# Patient Record
Sex: FEMALE | Race: BLACK OR AFRICAN AMERICAN | NOT HISPANIC OR LATINO | Employment: FULL TIME | URBAN - METROPOLITAN AREA
[De-identification: names, ages, dates, MRNs, and addresses within clinical notes are randomized per-mention and may not be internally consistent; named-entity substitution may affect disease eponyms.]

---

## 2018-06-05 PROBLEM — O26.872 SHORT CERVIX DURING PREGNANCY IN SECOND TRIMESTER: Status: ACTIVE | Noted: 2018-06-05

## 2018-06-05 PROBLEM — O09.212 HISTORY OF PRETERM LABOR, CURRENT PREGNANCY, SECOND TRIMESTER: Status: ACTIVE | Noted: 2018-06-05

## 2018-06-18 ENCOUNTER — OFFICE VISIT (OUTPATIENT)
Dept: OBSTETRICS AND GYNECOLOGY | Facility: CLINIC | Age: 28
End: 2018-06-18
Payer: MEDICAID

## 2018-06-18 VITALS — WEIGHT: 211.19 LBS | SYSTOLIC BLOOD PRESSURE: 138 MMHG | DIASTOLIC BLOOD PRESSURE: 78 MMHG

## 2018-06-18 DIAGNOSIS — O26.872 SHORT CERVIX DURING PREGNANCY IN SECOND TRIMESTER: ICD-10-CM

## 2018-06-18 DIAGNOSIS — Z3A.30 30 WEEKS GESTATION OF PREGNANCY: Primary | ICD-10-CM

## 2018-06-18 DIAGNOSIS — O09.212 HISTORY OF PRETERM LABOR, CURRENT PREGNANCY, SECOND TRIMESTER: ICD-10-CM

## 2018-06-18 PROCEDURE — 99999 PR PBB SHADOW E&M-NEW PATIENT-LVL III: CPT | Mod: PBBFAC,,, | Performed by: SPECIALIST

## 2018-06-18 PROCEDURE — 99203 OFFICE O/P NEW LOW 30 MIN: CPT | Mod: PBBFAC,TH,PN | Performed by: SPECIALIST

## 2018-06-18 PROCEDURE — 99204 OFFICE O/P NEW MOD 45 MIN: CPT | Mod: S$PBB,TH,, | Performed by: SPECIALIST

## 2018-06-18 NOTE — PROGRESS NOTES
"28 yo BF  presents for transefer of PNC. Pt 30 weeks EGA with significant H/O  delivery consistent with incompetent cervix at 30 weeks 2010. Pt has been eval by MFM with noted cervical lenfth 1.5cm. pty placed pn vaginal progesterone. Pt denies DFM, CTXs, vaginal d/c, LBOW, dysuria.    Past Medical History:   Diagnosis Date    Abnormal Pap smear of cervix        History reviewed. No pertinent surgical history.    History reviewed. No pertinent family history.    Social History     Social History    Marital status: Single     Spouse name: N/A    Number of children: N/A    Years of education: N/A     Social History Main Topics    Smoking status: Never Smoker    Smokeless tobacco: Former User    Alcohol use No    Drug use: No    Sexual activity: Yes     Partners: Male     Other Topics Concern    None     Social History Narrative    None       Current Outpatient Prescriptions   Medication Sig Dispense Refill    prenatal vit,calc76/iron/folic (PNV 29-1 ORAL) Take by mouth.      progesterone (PROMETRIUM) 200 MG capsule Place 1 capsule (200 mg total) vaginally nightly. 30 capsule 11     No current facility-administered medications for this visit.        Review of patient's allergies indicates:   Allergen Reactions    Bacitracin Nausea Only   Indication  ========  Hx of PTD at 30 wks in 2nd pregnancy; Dr. Lopez.    History  ======  Previous Outcomes   3  Para 2    Maternal Assessment  =================  Weight 95 kg  Weight (lb) 210 lb  BP syst 139 mmHg  BP diast 83 mmHg    Method  ======  Transabdominal ultrasound examination. View: Sufficient.    Pregnancy  =========  Boucher pregnancy. Number of fetuses: 1.    Dating  ======  GA by "stated dating" 23 w + 6 d  MARIOLA by "stated dating": 2018  Ultrasound examination on: 2018  GA by U/S based upon: AC, BPD, Femur, HC  GA by U/S 24 w + 4 d  MARIOLA by U/S: 2018  Assigned: Dating performed on 2018, based on the external " assessment  Assigned GA 23 w + 6 d  Assigned MARIOLA: 2018    General Evaluation  ==============  Cardiac activity: present.  bpm.  Fetal movements: visualized.  Presentation: cephalic.  Placenta: anterior.  Umbilical cord: 3 vessel cord.  Amniotic fluid: MVP 6.7 cm.  shortened cx on transvag exam- 15mm.    Fetal Biometry  ============  Fetal Biometry  BPD 60.5 mm 24w 4d Hadlock  OFD 74.7 mm 24w 5d Yobani  .9 mm 23w 6d Hadlock  .0 mm 23w 4d Hadlock  Femur 47.8 mm 26w 0d Hadlock  Cerebellum tr 26.3 mm 24w 5d Nails  CM 5.3 mm   g 58% Joaquin  Calculated by: Hadlock (BPD-HC-AC-FL)  EFW (lb) 1 lb  EFW (oz) 9 oz  Cephalic index 0.81  HC / AC 1.16  FL / BPD 0.79  FL / HC 0.22  FL / AC 0.25  MVP 6.7 cm   bpm    Fetal Anatomy  ===========  Cranium: normal  Lateral ventricles: normal  Choroid plexus: normal  Midline falx: normal  Cavum septi pellucidi: normal  Cerebellum: normal  Cisterna magna: normal  Lips: normal  Profile: suboptimal  Nose: normal  4-chamber view: normal  RVOT: normal  LVOT: normal  Heart / Thorax: septum appears intact  Situs: normal  Aortic arch: normal  Ductal arch: normal  SVC: normal  IVC: normal  3-vessel view: normal  Cord insertion: normal  Stomach: normal  Kidneys: normal  Bladder: normal  Genitals: normal  Cervical spine: normal  Thoracic spine: normal  Lumbar spine: normal  Sacral spine: normal  Arms: normal  Legs: normal  Gender: female    Maternal Structures  ===============  Uterus / Cervix  Cervical length 15.1 mm    Consultation  ==========  Chief complaint: History  Labor  Provider requesting consultation: Dr. Lopez  27 y.o. [id number] 23w6d EGA.  PMH:  Past Medical History:  Diagnosis Date  ? Abnormal Pap smear of cervix    PObHx:  OB History   Para Term  AB Living  3 2 1 1 2  SAB TAB Ectopic Multiple Live Births  2  # Outcome Date GA Lbr Keyshawn/2nd Weight Sex Delivery Anes PTL Lv  3 Current  2   30w0d 1.956 kg (4 lb 5  oz) M Vag-Spont N DONNA  1 Term  2.155 kg (4 lb 12 oz) M Vag-Spont N DONNA    PSH:History reviewed. No pertinent surgical history.  Family history:family history is not on file.  Social history: reports that she has never smoked. She has quit using smokeless tobacco. She reports that she does not drink alcohol or use drugs.  A detailed fetal anatomical ultrasound was completed today. See details in imaging section of [facility].  The patient is referred for a history of a 30 week delivery after having had a short cervix documented at 26 weeks. She never felt any contractions and was sent to the  hospital at 30 weeks 5 cm dilated.  On to' US the cervix measures 15 mm transvaginally. Significant mid-gestational cervical length shortening and its association with  birth were discussed with  the patient at length. I reviewed wih her the decreased incidence of  birth in nulliparous women who were given nightly vaginal progesterone (prometrium 200mg). I  also recommended to the patient the avoidance of sexual intercourse and excertional activity. I reviewed the signs and symptoms of  labor in depth. Prescription  given for progesterone to begin tonight.  Results of today's ultrasound discussed with patient. I spent 30 minutes with patient today over half of which was in consultation separate of her ultrasound examination.  Referring physician to receive copy of today's consultation via electronic medical record. Dr. Lopez contacted by phone  .    EXAM  VSS              Abdomen gravid, FH 29cm  LLQ              Cervix FT/thick     I had a 20 min discussion regarding PTL and Incomp cervix  Discussed pelvic rest and PTL precautions    I will obtain records for review  Pt to keep MFM appt Dr Jef BRYANT 2 weeks

## 2018-07-02 ENCOUNTER — ROUTINE PRENATAL (OUTPATIENT)
Dept: OBSTETRICS AND GYNECOLOGY | Facility: CLINIC | Age: 28
End: 2018-07-02
Payer: MEDICAID

## 2018-07-02 VITALS — WEIGHT: 213.38 LBS | SYSTOLIC BLOOD PRESSURE: 128 MMHG | DIASTOLIC BLOOD PRESSURE: 76 MMHG

## 2018-07-02 DIAGNOSIS — O26.872 SHORT CERVIX DURING PREGNANCY IN SECOND TRIMESTER: ICD-10-CM

## 2018-07-02 DIAGNOSIS — O09.212 HISTORY OF PRETERM LABOR, CURRENT PREGNANCY, SECOND TRIMESTER: ICD-10-CM

## 2018-07-02 DIAGNOSIS — Z3A.27 27 WEEKS GESTATION OF PREGNANCY: Primary | ICD-10-CM

## 2018-07-02 LAB
BILIRUB SERPL-MCNC: NORMAL MG/DL
BLOOD URINE, POC: NORMAL
COLOR, POC UA: YELLOW
GLUCOSE UR QL STRIP: NORMAL
KETONES UR QL STRIP: NORMAL
LEUKOCYTE ESTERASE URINE, POC: NORMAL
NITRITE, POC UA: NORMAL
PH, POC UA: 8
PROTEIN, POC: NORMAL
SPECIFIC GRAVITY, POC UA: NORMAL
UROBILINOGEN, POC UA: NORMAL

## 2018-07-02 PROCEDURE — 81002 URINALYSIS NONAUTO W/O SCOPE: CPT | Mod: PBBFAC,PN | Performed by: SPECIALIST

## 2018-07-02 PROCEDURE — 99999 PR PBB SHADOW E&M-EST. PATIENT-LVL II: CPT | Mod: PBBFAC,,, | Performed by: SPECIALIST

## 2018-07-02 PROCEDURE — 99212 OFFICE O/P EST SF 10 MIN: CPT | Mod: PBBFAC,TH,PN | Performed by: SPECIALIST

## 2018-07-02 PROCEDURE — 99214 OFFICE O/P EST MOD 30 MIN: CPT | Mod: TH,S$PBB,, | Performed by: SPECIALIST

## 2018-07-02 NOTE — PROGRESS NOTES
Excellent fetal movement  Completing GD screen today  Pt continuing vaginal prog and denies CTXs, vaginal bleeding, DFM, pelvic pressure  PTL precautions discussed  And continue pelvic rest  I reviewed pt's past medical history, past and current meds, family history, allergies and reviewed problem list  RTO 2 weeks

## 2018-07-03 ENCOUNTER — LAB VISIT (OUTPATIENT)
Dept: LAB | Facility: HOSPITAL | Age: 28
End: 2018-07-03
Attending: SPECIALIST
Payer: MEDICAID

## 2018-07-03 DIAGNOSIS — Z3A.27 27 WEEKS GESTATION OF PREGNANCY: ICD-10-CM

## 2018-07-03 LAB — GLUCOSE SERPL-MCNC: 137 MG/DL

## 2018-07-03 PROCEDURE — 36415 COLL VENOUS BLD VENIPUNCTURE: CPT | Mod: PO

## 2018-07-03 PROCEDURE — 82950 GLUCOSE TEST: CPT

## 2018-07-16 ENCOUNTER — ROUTINE PRENATAL (OUTPATIENT)
Dept: OBSTETRICS AND GYNECOLOGY | Facility: CLINIC | Age: 28
End: 2018-07-16
Payer: MEDICAID

## 2018-07-16 VITALS — WEIGHT: 212.75 LBS | DIASTOLIC BLOOD PRESSURE: 84 MMHG | SYSTOLIC BLOOD PRESSURE: 146 MMHG

## 2018-07-16 DIAGNOSIS — O26.872 SHORT CERVIX DURING PREGNANCY IN SECOND TRIMESTER: ICD-10-CM

## 2018-07-16 DIAGNOSIS — Z3A.29 29 WEEKS GESTATION OF PREGNANCY: Primary | ICD-10-CM

## 2018-07-16 DIAGNOSIS — O09.212 HISTORY OF PRETERM LABOR, CURRENT PREGNANCY, SECOND TRIMESTER: ICD-10-CM

## 2018-07-16 LAB
BILIRUB SERPL-MCNC: NORMAL MG/DL
BLOOD URINE, POC: NORMAL
COLOR, POC UA: YELLOW
GLUCOSE UR QL STRIP: NORMAL
KETONES UR QL STRIP: NORMAL
LEUKOCYTE ESTERASE URINE, POC: NORMAL
NITRITE, POC UA: NORMAL
PH, POC UA: 5
PROTEIN, POC: NORMAL
SPECIFIC GRAVITY, POC UA: NORMAL
UROBILINOGEN, POC UA: 1

## 2018-07-16 PROCEDURE — 81002 URINALYSIS NONAUTO W/O SCOPE: CPT | Mod: PBBFAC,PN | Performed by: SPECIALIST

## 2018-07-16 PROCEDURE — 99212 OFFICE O/P EST SF 10 MIN: CPT | Mod: PBBFAC,TH,PN | Performed by: SPECIALIST

## 2018-07-16 PROCEDURE — 99214 OFFICE O/P EST MOD 30 MIN: CPT | Mod: TH,S$PBB,, | Performed by: SPECIALIST

## 2018-07-16 PROCEDURE — 99999 PR PBB SHADOW E&M-EST. PATIENT-LVL II: CPT | Mod: PBBFAC,,, | Performed by: SPECIALIST

## 2018-07-16 NOTE — PROGRESS NOTES
Pt returns for continued care  Discussed PTL s/s and precautions  Pt denies overt CTXs, vaginal bleeding or spotting  I reviewed pt's past medical history, past and current meds, family history, allergies and reviewed problem list  EXAM Cervix 1/403 -2 vertex    Pt counseled on PTL and pelvic rest  RTO 2 weeks

## 2018-07-30 ENCOUNTER — ROUTINE PRENATAL (OUTPATIENT)
Dept: OBSTETRICS AND GYNECOLOGY | Facility: CLINIC | Age: 28
End: 2018-07-30
Payer: MEDICAID

## 2018-07-30 VITALS — SYSTOLIC BLOOD PRESSURE: 142 MMHG | WEIGHT: 214.94 LBS | DIASTOLIC BLOOD PRESSURE: 78 MMHG

## 2018-07-30 DIAGNOSIS — Z3A.31 31 WEEKS GESTATION OF PREGNANCY: Primary | ICD-10-CM

## 2018-07-30 DIAGNOSIS — O26.872 SHORT CERVIX DURING PREGNANCY IN SECOND TRIMESTER: ICD-10-CM

## 2018-07-30 LAB
BILIRUB SERPL-MCNC: NORMAL MG/DL
BLOOD URINE, POC: NORMAL
COLOR, POC UA: YELLOW
GLUCOSE UR QL STRIP: NORMAL
KETONES UR QL STRIP: NORMAL
LEUKOCYTE ESTERASE URINE, POC: NORMAL
NITRITE, POC UA: NORMAL
PH, POC UA: 5
PROTEIN, POC: NORMAL
SPECIFIC GRAVITY, POC UA: NORMAL
UROBILINOGEN, POC UA: NORMAL

## 2018-07-30 PROCEDURE — 81002 URINALYSIS NONAUTO W/O SCOPE: CPT | Mod: PBBFAC,PN | Performed by: SPECIALIST

## 2018-07-30 PROCEDURE — 99212 OFFICE O/P EST SF 10 MIN: CPT | Mod: PBBFAC,TH,PN | Performed by: SPECIALIST

## 2018-07-30 PROCEDURE — 99214 OFFICE O/P EST MOD 30 MIN: CPT | Mod: TH,S$PBB,, | Performed by: SPECIALIST

## 2018-07-30 PROCEDURE — 99999 PR PBB SHADOW E&M-EST. PATIENT-LVL II: CPT | Mod: PBBFAC,,, | Performed by: SPECIALIST

## 2018-07-30 NOTE — PROGRESS NOTES
Excellent fetal movement  Discussed PTL precautions, pelvic rest  EXAM Cervix FT/20 vertex soft  I reviewed pt's past medical history, past and current meds, family history, allergies and reviewed problem list  RTO 2 weeks

## 2018-08-06 ENCOUNTER — TELEPHONE (OUTPATIENT)
Dept: OBSTETRICS AND GYNECOLOGY | Facility: CLINIC | Age: 28
End: 2018-08-06

## 2018-08-06 ENCOUNTER — NURSE TRIAGE (OUTPATIENT)
Dept: ADMINISTRATIVE | Facility: CLINIC | Age: 28
End: 2018-08-06

## 2018-08-06 PROBLEM — N92.0 SPOTTING: Status: ACTIVE | Noted: 2018-08-06

## 2018-08-06 NOTE — TELEPHONE ENCOUNTER
Reason for Disposition   Abdominal pain or having contractions    Protocols used: ST PREGNANCY - VAGINAL BLEEDING GREATER THAN 20 WEEKS EGA-A-[place]    Pt states that she is 33 weeks pregnant and having vaginal spotting and contractions. States contractions are spread out and started last night. Vaginal spotting started today. Care advice given.

## 2018-08-06 NOTE — TELEPHONE ENCOUNTER
Spoke with patient informed if spotting and/or randa to go to L&D for evaluation, patient states she is on her way there now

## 2018-08-13 ENCOUNTER — ROUTINE PRENATAL (OUTPATIENT)
Dept: OBSTETRICS AND GYNECOLOGY | Facility: CLINIC | Age: 28
End: 2018-08-13
Payer: MEDICAID

## 2018-08-13 VITALS
WEIGHT: 215.38 LBS | SYSTOLIC BLOOD PRESSURE: 128 MMHG | BODY MASS INDEX: 34.76 KG/M2 | DIASTOLIC BLOOD PRESSURE: 70 MMHG

## 2018-08-13 DIAGNOSIS — Z3A.33 33 WEEKS GESTATION OF PREGNANCY: Primary | ICD-10-CM

## 2018-08-13 DIAGNOSIS — O26.872 SHORT CERVIX DURING PREGNANCY IN SECOND TRIMESTER: ICD-10-CM

## 2018-08-13 PROCEDURE — 99212 OFFICE O/P EST SF 10 MIN: CPT | Mod: TH,S$PBB,, | Performed by: SPECIALIST

## 2018-08-13 PROCEDURE — 99212 OFFICE O/P EST SF 10 MIN: CPT | Mod: PBBFAC,TH,PN | Performed by: SPECIALIST

## 2018-08-13 PROCEDURE — 81002 URINALYSIS NONAUTO W/O SCOPE: CPT | Mod: PBBFAC,PN | Performed by: SPECIALIST

## 2018-08-13 PROCEDURE — 99999 PR PBB SHADOW E&M-EST. PATIENT-LVL II: CPT | Mod: PBBFAC,,, | Performed by: SPECIALIST

## 2018-08-13 NOTE — PROGRESS NOTES
Excellent fetal movement  EXAM Cervix 2-3/70/-1 vertex  I discussed cervical change and PTL precautions  Discussed fetal kick counts daily  I reviewed pt's past medical history, past and current meds, family history, allergies and reviewed problem list  RTO 2 weeks

## 2018-08-15 PROBLEM — O26.859 SPOTTING AFFECTING PREGNANCY: Status: ACTIVE | Noted: 2018-08-15

## 2018-08-18 PROBLEM — R10.9 ABDOMINAL CRAMPING AFFECTING PREGNANCY: Status: ACTIVE | Noted: 2018-08-18

## 2018-08-18 PROBLEM — O26.899 ABDOMINAL CRAMPING AFFECTING PREGNANCY: Status: ACTIVE | Noted: 2018-08-18

## 2018-08-19 PROBLEM — O60.00 PRETERM LABOR: Status: ACTIVE | Noted: 2018-08-19

## 2018-09-20 ENCOUNTER — POSTPARTUM VISIT (OUTPATIENT)
Dept: OBSTETRICS AND GYNECOLOGY | Facility: CLINIC | Age: 28
End: 2018-09-20
Payer: MEDICAID

## 2018-09-20 VITALS
HEIGHT: 66 IN | BODY MASS INDEX: 32.38 KG/M2 | WEIGHT: 201.5 LBS | SYSTOLIC BLOOD PRESSURE: 100 MMHG | DIASTOLIC BLOOD PRESSURE: 60 MMHG

## 2018-09-20 PROCEDURE — 99999 PR PBB SHADOW E&M-EST. PATIENT-LVL III: CPT | Mod: PBBFAC,,, | Performed by: SPECIALIST

## 2018-09-20 PROCEDURE — 99213 OFFICE O/P EST LOW 20 MIN: CPT | Mod: PBBFAC,PN | Performed by: SPECIALIST

## 2018-09-20 PROCEDURE — 0503F POSTPARTUM CARE VISIT: CPT | Mod: ,,, | Performed by: SPECIALIST

## 2018-09-20 NOTE — PROGRESS NOTES
29 yo BF presents for PP evaluation, s/p . Pt denies menorrhagia, PP, PP depression, n/v, vaginal d/c.  Past Medical History:   Diagnosis Date    Abnormal Pap smear of cervix        No past surgical history on file.    Family History   Problem Relation Age of Onset    Breast cancer Maternal Grandmother     Hypertension Mother        Social History     Socioeconomic History    Marital status: Single     Spouse name: None    Number of children: None    Years of education: None    Highest education level: None   Social Needs    Financial resource strain: None    Food insecurity - worry: None    Food insecurity - inability: None    Transportation needs - medical: None    Transportation needs - non-medical: None   Occupational History    None   Tobacco Use    Smoking status: Never Smoker    Smokeless tobacco: Never Used   Substance and Sexual Activity    Alcohol use: No    Drug use: No    Sexual activity: Yes     Partners: Male   Other Topics Concern    None   Social History Narrative    None       Current Outpatient Medications   Medication Sig Dispense Refill    prenatal vit,calc76/iron/folic (PNV 29-1 ORAL) Take by mouth.       No current facility-administered medications for this visit.        Review of patient's allergies indicates:   Allergen Reactions    Bactrim [sulfamethoxazole-trimethoprim] Nausea Only       Review of System:   General: no chills, fever, night sweats, weight gain or weight loss  Psychological: no depression or suicidal ideation  Breasts: no new or changing breast lumps, nipple discharge or masses.  Respiratory: no cough, shortness of breath, or wheezing  Cardiovascular: no chest pain or dyspnea on exertion  Gastrointestinal: no abdominal pain, change in bowel habits, or black or bloody stools  Genito-Urinary: no incontinence, urinary frequency/urgency or vulvar/vaginal symptoms, pelvic pain or abnormal vaginal bleeding.  Musculoskeletal: no gait disturbance or muscular  weakness    I discussed contraceptive options and pt desires Nexplanon placement.  I will verify coverage and schedule nexplanon

## 2018-10-04 ENCOUNTER — POSTPARTUM VISIT (OUTPATIENT)
Dept: OBSTETRICS AND GYNECOLOGY | Facility: CLINIC | Age: 28
End: 2018-10-04
Payer: MEDICAID

## 2018-10-04 VITALS
WEIGHT: 203.94 LBS | HEIGHT: 66 IN | DIASTOLIC BLOOD PRESSURE: 80 MMHG | BODY MASS INDEX: 32.78 KG/M2 | SYSTOLIC BLOOD PRESSURE: 126 MMHG

## 2018-10-04 DIAGNOSIS — Z12.4 ENCOUNTER FOR PAP SMEAR OF CERVIX WITH HPV DNA COTESTING: ICD-10-CM

## 2018-10-04 DIAGNOSIS — Z30.017 ENCOUNTER FOR INITIAL PRESCRIPTION OF IMPLANTABLE SUBDERMAL CONTRACEPTIVE: ICD-10-CM

## 2018-10-04 DIAGNOSIS — Z01.812 PRE-PROCEDURE LAB EXAM: Primary | ICD-10-CM

## 2018-10-04 LAB
B-HCG UR QL: NEGATIVE
CTP QC/QA: YES

## 2018-10-04 PROCEDURE — 88175 CYTOPATH C/V AUTO FLUID REDO: CPT

## 2018-10-04 PROCEDURE — 87624 HPV HI-RISK TYP POOLED RSLT: CPT

## 2018-10-04 PROCEDURE — 11981 INSERTION DRUG DLVR IMPLANT: CPT | Mod: PBBFAC,PN | Performed by: SPECIALIST

## 2018-10-04 PROCEDURE — 81025 URINE PREGNANCY TEST: CPT | Mod: PBBFAC,PN | Performed by: SPECIALIST

## 2018-10-04 PROCEDURE — 99999 PR PBB SHADOW E&M-EST. PATIENT-LVL III: CPT | Mod: PBBFAC,,, | Performed by: SPECIALIST

## 2018-10-04 PROCEDURE — 99213 OFFICE O/P EST LOW 20 MIN: CPT | Mod: PBBFAC,PN | Performed by: SPECIALIST

## 2018-10-04 PROCEDURE — 11981 INSERTION DRUG DLVR IMPLANT: CPT | Mod: S$PBB,,, | Performed by: SPECIALIST

## 2018-10-04 NOTE — PROGRESS NOTES
29 yo BF presents for postpartum exam and Nexplanon placement.  UPT Negative and pt staes she has NOT been sexually active since delivery.  Past Medical History:   Diagnosis Date    Abnormal Pap smear of cervix        History reviewed. No pertinent surgical history.    Family History   Problem Relation Age of Onset    Breast cancer Maternal Grandmother     Hypertension Mother        Social History     Socioeconomic History    Marital status: Single     Spouse name: None    Number of children: None    Years of education: None    Highest education level: None   Social Needs    Financial resource strain: None    Food insecurity - worry: None    Food insecurity - inability: None    Transportation needs - medical: None    Transportation needs - non-medical: None   Occupational History    None   Tobacco Use    Smoking status: Never Smoker    Smokeless tobacco: Never Used   Substance and Sexual Activity    Alcohol use: No    Drug use: No    Sexual activity: Yes     Partners: Male   Other Topics Concern    None   Social History Narrative    None       Current Outpatient Medications   Medication Sig Dispense Refill    prenatal vit,calc76/iron/folic (PNV 29-1 ORAL) Take by mouth.       No current facility-administered medications for this visit.        Review of patient's allergies indicates:   Allergen Reactions    Bactrim [sulfamethoxazole-trimethoprim] Nausea Only       Review of System:   General: no chills, fever, night sweats, weight gain or weight loss  Psychological: no depression or suicidal ideation  Breasts: no new or changing breast lumps, nipple discharge or masses.  Respiratory: no cough, shortness of breath, or wheezing  Cardiovascular: no chest pain or dyspnea on exertion  Gastrointestinal: no abdominal pain, change in bowel habits, or black or bloody stools  Genito-Urinary: no incontinence, urinary frequency/urgency or vulvar/vaginal symptoms, pelvic pain or abnormal vaginal  bleeding.  Musculoskeletal: no gait disturbance or muscular weakness    General Appearance:  Alert, cooperative, no distress, appears stated age   Head:  Normocephalic, without obvious abnormality, atraumatic   Eyes:  PERRL, conjunctiva/corneas clear, EOM's intact, fundi benign, both eyes   Ears:  Normal TM's and external ear canals, both ears   Nose: Nares normal, septum midline,mucosa normal, no drainage or sinus tenderness   Throat: Lips, mucosa, and tongue normal; teeth and gums normal   Neck: Supple, symmetrical, trachea midline, no adenopathy;  thyroid: not enlarged, symmetric, no tenderness/mass/nodules; no carotid bruit or JVD   Back:   Symmetric, no curvature, ROM normal, no CVA tenderness   Lungs:   Clear to auscultation bilaterally, respirations unlabored   Breasts:  No masses or tenderness   Heart:  Regular rate and rhythm, S1 and S2 normal, no murmur, rub, or gallop   Abdomen:   Soft, non-tender, bowel sounds active all four quadrants,  no masses, no organomegaly    Genitourinary:   External rectal exam shows no thrombosed external hemorrhoids.   Pelvic exam was performed with patient supine.  No labial fusion.  There is no rash, lesion or injury on the right labia.  There is no rash, lesion or injury on the left labia.  No bleeding and no signs of injury around the vaginal introitus, urethra is without lesions and well supported. The cervix is visualized with no discharge, lesions or friability.  No vaginal discharge found.  No significant Cystocele, Enterocele or rectocele, and uterus well supported.  Bimanual exam:  The urethra is normal to palpation and there are no palpable vaginal wall masses.  Uterus is not deviated, not enlarged, not fixed, normal shape and not tender.  Cervix exhibits no motion tenderness.   Right adnexum displays no mass and no tenderness.  Left adnexum displays no mass and no tenderness.   Extremities: Extremities normal, atraumatic, no cyanosis or edema   Pulses: 2+ and  symmetric   Skin: Skin color, texture, turgor normal, no rashes or lesions   Lymph nodes: Cervical, supraclavicular, and axillary nodes normal   Neurologic: Normal       PAP submitted    Nexplanon Insertion    After informed consent, The pt's UPT was confirmed as negative.  Betadine solution was used to prep the inner upper left arm surface.  1% lidocaine was administered subcutaneously along the prepped site.  The Nexplanon insertion device was the used to place the contraceptive capsule along the subcutaneous tract in the arm. The lever was depressed and the capsule deposited full thickness without difficulty.  The capsule was palpable subcutaneously and NOT visible at the insertion site.  A compression bandage was applied and pt was instructed to remove in 2 hours.  Pt was counseled 15 minutes on s/s of infection and instructed to use secondary contraceptive for 4 weeks.  Pt tolerated procedure well.    Pt instructed to remove bandage 30 min  RTO 1 year/prn

## 2018-10-11 LAB
HPV HR 12 DNA CVX QL NAA+PROBE: NEGATIVE
HPV16 AG SPEC QL: NEGATIVE
HPV18 DNA SPEC QL NAA+PROBE: NEGATIVE

## 2018-11-08 ENCOUNTER — TELEPHONE (OUTPATIENT)
Dept: OBSTETRICS AND GYNECOLOGY | Facility: CLINIC | Age: 28
End: 2018-11-08

## 2018-11-08 NOTE — TELEPHONE ENCOUNTER
----- Message from Sade Gray sent at 11/8/2018 10:37 AM CST -----  Type: Needs Medical Advice    Who Called:  Patient  Best Call Back Number: [phone]  Additional Information: Patient wants to know if there are any special instructions before she comes in next week for removal of her birth control implant    Thank you

## 2018-11-13 ENCOUNTER — OFFICE VISIT (OUTPATIENT)
Dept: OBSTETRICS AND GYNECOLOGY | Facility: CLINIC | Age: 28
End: 2018-11-13
Payer: MEDICAID

## 2018-11-13 VITALS
WEIGHT: 198.19 LBS | HEIGHT: 66 IN | SYSTOLIC BLOOD PRESSURE: 122 MMHG | DIASTOLIC BLOOD PRESSURE: 78 MMHG | BODY MASS INDEX: 31.85 KG/M2

## 2018-11-13 DIAGNOSIS — Z30.46 ENCOUNTER FOR NEXPLANON REMOVAL: Primary | ICD-10-CM

## 2018-11-13 PROCEDURE — 99214 OFFICE O/P EST MOD 30 MIN: CPT | Mod: 25,S$PBB,, | Performed by: SPECIALIST

## 2018-11-13 PROCEDURE — 99213 OFFICE O/P EST LOW 20 MIN: CPT | Mod: PBBFAC,PN | Performed by: SPECIALIST

## 2018-11-13 PROCEDURE — 11982 REMOVE DRUG IMPLANT DEVICE: CPT | Mod: PBBFAC,PN | Performed by: SPECIALIST

## 2018-11-13 PROCEDURE — 11982 REMOVE DRUG IMPLANT DEVICE: CPT | Mod: S$PBB,,, | Performed by: SPECIALIST

## 2018-11-13 PROCEDURE — 99999 PR PBB SHADOW E&M-EST. PATIENT-LVL III: CPT | Mod: PBBFAC,,, | Performed by: SPECIALIST

## 2018-11-13 RX ORDER — MEDROXYPROGESTERONE ACETATE 150 MG/ML
150 INJECTION, SUSPENSION INTRAMUSCULAR ONCE
Qty: 1 ML | Refills: 2 | Status: SHIPPED | OUTPATIENT
Start: 2018-11-13 | End: 2018-11-13

## 2018-11-13 NOTE — PROGRESS NOTES
27 yo BF presents for continued contraceptive management. Pt desires to discontinue Nexplanon secondary to mood lability. Pt desires removal and to resume DPMA.  Past Medical History:   Diagnosis Date    Abnormal Pap smear of cervix        History reviewed. No pertinent surgical history.    Family History   Problem Relation Age of Onset    Breast cancer Maternal Grandmother     Hypertension Mother        Social History     Socioeconomic History    Marital status: Single     Spouse name: None    Number of children: None    Years of education: None    Highest education level: None   Social Needs    Financial resource strain: None    Food insecurity - worry: None    Food insecurity - inability: None    Transportation needs - medical: None    Transportation needs - non-medical: None   Occupational History    None   Tobacco Use    Smoking status: Never Smoker    Smokeless tobacco: Never Used   Substance and Sexual Activity    Alcohol use: No    Drug use: No    Sexual activity: Yes     Partners: Male     Birth control/protection: Implant   Other Topics Concern    None   Social History Narrative    None       Current Outpatient Medications   Medication Sig Dispense Refill    prenatal vit,calc76/iron/folic (PNV 29-1 ORAL) Take by mouth.       Current Facility-Administered Medications   Medication Dose Route Frequency Provider Last Rate Last Dose    etonogestrel Impl 68 mg  1 each Subdermal Once Shantanu Bergman MD           Review of patient's allergies indicates:   Allergen Reactions    Bactrim [sulfamethoxazole-trimethoprim] Nausea Only       Review of System:   General: no chills, fever, night sweats, weight gain or weight loss  Psychological: no depression or suicidal ideation  Breasts: no new or changing breast lumps, nipple discharge or masses.  Respiratory: no cough, shortness of breath, or wheezing  Cardiovascular: no chest pain or dyspnea on exertion  Gastrointestinal: no abdominal pain,  change in bowel habits, or black or bloody stools  Genito-Urinary: no incontinence, urinary frequency/urgency or vulvar/vaginal symptoms, pelvic pain or abnormal vaginal bleeding.  Musculoskeletal: no gait disturbance or muscular weakness    Procedure  Nexplanon Removal     After informed consent, betadine prep over implant site followed by !% Lidocaine administered subQ over implant site. Small incision made over implant and mosquito hemostats used to grasp and remove implant w/o difficulty. Site bandaged and pt instructed to remove 30 min.       Plan Will escribe DPMA and pt will have office administer q 90 days

## 2019-07-15 PROBLEM — O26.872 SHORT CERVIX DURING PREGNANCY IN SECOND TRIMESTER: Status: RESOLVED | Noted: 2018-06-05 | Resolved: 2019-07-15
